# Patient Record
Sex: FEMALE | Race: WHITE
[De-identification: names, ages, dates, MRNs, and addresses within clinical notes are randomized per-mention and may not be internally consistent; named-entity substitution may affect disease eponyms.]

---

## 2018-07-24 ENCOUNTER — HOSPITAL ENCOUNTER (EMERGENCY)
Dept: HOSPITAL 25 - UCEAST | Age: 38
Discharge: HOME | End: 2018-07-24
Payer: COMMERCIAL

## 2018-07-24 VITALS — SYSTOLIC BLOOD PRESSURE: 126 MMHG | DIASTOLIC BLOOD PRESSURE: 97 MMHG

## 2018-07-24 DIAGNOSIS — Z87.891: ICD-10-CM

## 2018-07-24 DIAGNOSIS — J02.9: ICD-10-CM

## 2018-07-24 DIAGNOSIS — B34.9: Primary | ICD-10-CM

## 2018-07-24 PROCEDURE — 99201: CPT

## 2018-07-24 PROCEDURE — 87651 STREP A DNA AMP PROBE: CPT

## 2018-07-24 PROCEDURE — G0463 HOSPITAL OUTPT CLINIC VISIT: HCPCS

## 2018-07-24 NOTE — UC
Throat Pain/Nasal Jose HPI





- HPI Summary


HPI Summary: 





37 yo female presents with sore throat and noticed some blister-like lesions 

inside of her mouth. She tells me that the last 2 days she felt feverish and 

more tired than usual. Does have a toddler daughter at home that has been 

around schoolmates with hand, foot, and mouth disease. Pt denies chills, cough, 

SOB ,chest pain, abdominal pain, n/v.








- History of Current Complaint


Chief Complaint: UCRespiratory


Stated Complaint: SORE THROAT,CONGESTED


Time Seen by Provider: 07/24/18 16:02


Hx Obtained From: Patient


Hx Last Menstrual Period: Decemeber 2017


Onset/Duration: Gradual Onset


Severity: Moderate


Pain Intensity: 5


Pain Scale Used: 0-10 Numeric





- Allergies/Home Medications


Allergies/Adverse Reactions: 


 Allergies











Allergy/AdvReac Type Severity Reaction Status Date / Time


 


No Known Allergies Allergy   Verified 07/24/18 15:49











Home Medications: 


 Home Medications





ALPRAZolam TAB* [Xanax TAB*]  07/24/18 [History]


Ibuprofen TAB* [Advil TAB*]  07/24/18 [History]











PMH/Surg Hx/FS Hx/Imm Hx


Psychological History: Anxiety





- Surgical History


Surgical History: Yes


Surgery Procedure, Year, and Place: c section March 2015





- Family History


Known Family History: Positive: None





- Social History


Occupation: Employed Full-time


Lives: With Family


Alcohol Use: Weekly


Substance Use Type: None


Smoking Status (MU): Former Smoker


Type: Cigarettes


Have You Smoked in the Last Year: Yes


When Did the Patient Quit Smoking/Using Tobacco: wehn became pregnant





- Immunization History


Most Recent Influenza Vaccination: 12/26/14


Most Recent Tetanus Shot: 12/26/14


Most Recent Pneumonia Vaccination: none





Review of Systems


Constitutional: Fever


Skin: Negative


Eyes: Negative


ENT: Sore Throat


Respiratory: Negative


Cardiovascular: Negative


Gastrointestinal: Negative


Neurological: Negative


Psychological: Negative


All Other Systems Reviewed And Are Negative: Yes





Physical Exam





- Summary


Physical Exam Summary: 





GENERAL: NAD. WDWN. No pain distress.


SKIN: No rashes, sores, lesions, or open wounds.


HEENT:


            Head: AT/NC


            Eyes: Conjunctiva clear without inflammation or discharge.


            Ears: Hearing grossly normal. TMs intact, no bulging, erythema, or 

edema. 


            Nose: Nasal mucosa pink and moist. NTTP maxillary and frontal 

sinus. 


            Throat: Posterior oropharynx mild erythema and scant blister-like 

lesions on mucosa. No tonsillar enlargement. No exudates. Uvula midline. No 

hoarse voice or muffled voice.


NECK: Supple. Nontender. No lymphadenopathy. 


CHEST:  CTAB. No r/r/w. No accessory muscle use. Breathing comfortably and in 

no distress.


CV:  RRR. Without m/r/g. Pulses intact. Brisk cap refill.


NEURO: Alert. CN II-XII grossly intact.


PSYCH: Age appropriate behavior.


Triage Information Reviewed: Yes


Vital Signs: 


 Initial Vital Signs











Temp  98.5 F   07/24/18 15:43


 


Pulse  86   07/24/18 15:43


 


Resp  16   07/24/18 15:43


 


BP  126/97   07/24/18 15:43


 


Pulse Ox  100   07/24/18 15:43








 Laboratory Tests











  07/24/18





  16:09


 


Group A Strep Rapid  Negative











Vital Signs Reviewed: Yes





Throat Pain/Nasal Course/Dx





- Course


Course Of Treatment: POC strep negative. Suspect hand, foot, mouth disease vs 

other viral illness. Advised pt to take tylenol and/or ibuprofen for fever and 

discomfort.





- Differential Dx/Diagnosis


Provider Diagnoses: Viral illness





Discharge





- Sign-Out/Discharge


Documenting (check all that apply): Patient Departure





- Discharge Plan


Condition: Stable


Disposition: HOME


Patient Education Materials:  Hand, Foot, and Mouth Disease (ED), Viral 

Syndrome (ED)


Referrals: 


Chad Skinner MD [Primary Care Provider] - 


Additional Instructions: 


If you develop a fever, shortness of breath, chest pain, new or worsening 

symptoms - please call your PCP or go to the ED.


 








- Billing Disposition and Condition


Condition: STABLE


Disposition: Home

## 2018-12-10 ENCOUNTER — HOSPITAL ENCOUNTER (EMERGENCY)
Dept: HOSPITAL 25 - UCEAST | Age: 38
Discharge: HOME | End: 2018-12-10
Payer: COMMERCIAL

## 2018-12-10 VITALS — DIASTOLIC BLOOD PRESSURE: 95 MMHG | SYSTOLIC BLOOD PRESSURE: 132 MMHG
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
## 2018-12-10 DIAGNOSIS — S05.02XA: Primary | ICD-10-CM
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
## 2018-12-10 DIAGNOSIS — F17.210: ICD-10-CM

## 2018-12-10 PROCEDURE — G0463 HOSPITAL OUTPT CLINIC VISIT: HCPCS

## 2018-12-10 PROCEDURE — 99212 OFFICE O/P EST SF 10 MIN: CPT

## 2018-12-10 NOTE — UC
Eye Complaint HPI





- HPI Summary


HPI Summary: 





39 yo female presents with LEFT eye injury. Pt tells me that this morning her 

daughter was putting her shoes on and accidentally poked pt in left eye. Since 

that time pt has had eye irritation and mild FB sensation. She does not wear 

glasses or contacts. No vision changes. 











- History of Current Complaint


Chief Complaint: UCEye


Stated Complaint: EYE COMPLAINT


Time Seen by Provider: 12/10/18 16:23


Hx Obtained From: Patient


Hx Last Menstrual Period: Decemeber 2017


Onset/Duration: Sudden Onset


Timing: Constant


Severity Initially: Mild


Severity Currently: Mild


Pain Intensity: 3


Pain Scale Used: 0-10 Numeric





- Allergies/Home Medications


Allergies/Adverse Reactions: 


 Allergies











Allergy/AdvReac Type Severity Reaction Status Date / Time


 


No Known Allergies Allergy   Verified 12/10/18 16:17














PMH/Surg Hx/FS Hx/Imm Hx


Psychological History: Anxiety





- Surgical History


Surgical History: Yes


Surgery Procedure, Year, and Place: c section March 2015





- Family History


Known Family History: Positive: None





- Social History


Occupation: Employed Full-time


Lives: With Family


Alcohol Use: Daily


Substance Use Type: Marijuana


Smoking Status (MU): Light Every Day Tobacco Smoker


Type: Cigarettes


Have You Smoked in the Last Year: Yes


When Did the Patient Quit Smoking/Using Tobacco: wehn became pregnant





- Immunization History


Most Recent Influenza Vaccination: 12/26/14


Most Recent Tetanus Shot: 12/26/14


Most Recent Pneumonia Vaccination: none





Review of Systems


All Other Systems Reviewed And Are Negative: Yes


Constitutional: Positive: Negative


Skin: Positive: Negative


Eyes: Positive: Eye Redness


ENT: Positive: Negative


Respiratory: Positive: Negative


Neurovascular: Positive: Negative


Neurological: Positive: Negative


Psychological: Positive: Negative





Physical Exam





- Summary


Physical Exam Summary: 





GENERAL: WDWN. No pain distress.


SKIN: No rashes, sores, lesions, or open wounds.


HEENT:


            Head: AT/NC


            Eyes: EOM intact. PERRLA. LEFT EYE: 3 o'clock area of linear 

erythema and injection along sclera. Fluorsecein exam: Increased uptake along 

area of erythema consistent with abrasion. No simone sign.


CHEST:  No accessory muscle use. Breathing comfortably and in no distress.


CV:  Pulses intact. Cap refill <2seconds


NEURO: Alert.


PSYCH: Age appropriate behavior.


 


Triage Information Reviewed: Yes


Vital Signs: 


 Initial Vital Signs











Temp  99.4 F   12/10/18 16:13


 


Pulse  95   12/10/18 16:13


 


Resp  18   12/10/18 16:13


 


BP  132/95   12/10/18 16:13


 


Pulse Ox  99   12/10/18 16:13











Vital Signs Reviewed: Yes





Eye Complaint Course/Dx





- Course


Course Of Treatment: Tetracaine was instilled into the left eye and pt's pain 

resolved. Dye exam reveals abrasion. Will start her on polytrim eye drops.





- Differential Dx/Diagnosis


Provider Diagnosis: 


 Corneal abrasion, left








Discharge





- Sign-Out/Discharge


Documenting (check all that apply): Patient Departure


All imaging exams completed and their final reports reviewed: No Studies





- Discharge Plan


Condition: Stable


Disposition: HOME


Prescriptions: 


Polymyx/Trimethoprim OPTH* [Polytrim OPHTH*] 1 drop LEFT EYE QID #1 btl


Patient Education Materials:  Corneal Abrasion (ED)


Referrals: 


Chad Skinner MD [Primary Care Provider] - 


Additional Instructions: 


If you develop a fever, shortness of breath, chest pain, new or worsening 

symptoms - please call your PCP or go to the ED.


 





Your blood pressure was high at todays visit. Please see your primary provider 

within 4 weeks for recheck and re-evaluation.








- Billing Disposition and Condition


Condition: STABLE


Disposition: Home